# Patient Record
Sex: MALE
[De-identification: names, ages, dates, MRNs, and addresses within clinical notes are randomized per-mention and may not be internally consistent; named-entity substitution may affect disease eponyms.]

---

## 2021-12-13 PROBLEM — Z00.00 ENCOUNTER FOR PREVENTIVE HEALTH EXAMINATION: Status: ACTIVE | Noted: 2021-12-13

## 2022-02-01 DIAGNOSIS — Z01.812 ENCOUNTER FOR PREPROCEDURAL LABORATORY EXAMINATION: ICD-10-CM

## 2022-04-15 ENCOUNTER — APPOINTMENT (OUTPATIENT)
Dept: PULMONOLOGY | Facility: CLINIC | Age: 42
End: 2022-04-15
Payer: COMMERCIAL

## 2022-04-15 VITALS
TEMPERATURE: 98 F | DIASTOLIC BLOOD PRESSURE: 80 MMHG | HEIGHT: 69 IN | OXYGEN SATURATION: 98 % | WEIGHT: 192 LBS | HEART RATE: 70 BPM | RESPIRATION RATE: 16 BRPM | SYSTOLIC BLOOD PRESSURE: 120 MMHG | BODY MASS INDEX: 28.44 KG/M2

## 2022-04-15 DIAGNOSIS — U07.1 COVID-19: ICD-10-CM

## 2022-04-15 DIAGNOSIS — Z57.9 OCCUPATIONAL EXPOSURE TO UNSPECIFIED RISK FACTOR: ICD-10-CM

## 2022-04-15 DIAGNOSIS — Z86.19 PERSONAL HISTORY OF OTHER INFECTIOUS AND PARASITIC DISEASES: ICD-10-CM

## 2022-04-15 DIAGNOSIS — Z87.09 PERSONAL HISTORY OF OTHER DISEASES OF THE RESPIRATORY SYSTEM: ICD-10-CM

## 2022-04-15 DIAGNOSIS — F90.1 ATTENTION-DEFICIT HYPERACTIVITY DISORDER, PREDOMINANTLY HYPERACTIVE TYPE: ICD-10-CM

## 2022-04-15 DIAGNOSIS — Z78.9 OTHER SPECIFIED HEALTH STATUS: ICD-10-CM

## 2022-04-15 DIAGNOSIS — Z87.01 PERSONAL HISTORY OF PNEUMONIA (RECURRENT): ICD-10-CM

## 2022-04-15 DIAGNOSIS — J45.909 UNSPECIFIED ASTHMA, UNCOMPLICATED: ICD-10-CM

## 2022-04-15 DIAGNOSIS — Z82.5 FAMILY HISTORY OF ASTHMA AND OTHER CHRONIC LOWER RESPIRATORY DISEASES: ICD-10-CM

## 2022-04-15 PROCEDURE — 94729 DIFFUSING CAPACITY: CPT

## 2022-04-15 PROCEDURE — 95012 NITRIC OXIDE EXP GAS DETER: CPT

## 2022-04-15 PROCEDURE — 71046 X-RAY EXAM CHEST 2 VIEWS: CPT

## 2022-04-15 PROCEDURE — 99204 OFFICE O/P NEW MOD 45 MIN: CPT | Mod: 25

## 2022-04-15 PROCEDURE — 94010 BREATHING CAPACITY TEST: CPT

## 2022-04-15 PROCEDURE — 94727 GAS DIL/WSHOT DETER LNG VOL: CPT

## 2022-04-15 PROCEDURE — 94618 PULMONARY STRESS TESTING: CPT

## 2022-04-15 PROCEDURE — ZZZZZ: CPT

## 2022-04-15 RX ORDER — ALBUTEROL SULFATE 90 UG/1
108 (90 BASE) INHALANT RESPIRATORY (INHALATION)
Refills: 0 | Status: ACTIVE | COMMUNITY

## 2022-04-15 SDOH — HEALTH STABILITY - PHYSICAL HEALTH: OCCUPATIONAL EXPOSURE TO UNSPECIFIED RISK FACTOR: Z57.9

## 2022-04-15 NOTE — PROCEDURE
[FreeTextEntry1] : CXR revealed a normal sized heart; there was no evidence of infiltrate or effusion -- A normal appearing chest radiograph \par \par FENO was 13; a normal value being less than 25. Fractional exhaled nitric oxide (FENO) is regarded as a simple, noninvasive method for assessing eosinophilic airway inflammation. Produced by a variety of cells within the lung, nitric oxide (NO) concentrations are generally low in healthy individuals. However, high concentrations of NO appear to be involved in nonspecific host defense mechanisms and chronic inflammatory  diseases such as asthma. The American Thoracic Society (ATS) therefore recommended using FENO to aid in the diagnosis and monitoring of eosinophilic airway inflammation and asthma, and for identifying steroid responsive individuals whose chronic respiratory symptoms may be caused by airway inflammation \par \par Full PFT revealed very mild obstructive dysfunction at mid-low lung volumes, with a FEV1 of 3.79L, which is 92% of predicted, normal lung volumes, and a diffusion of 21.9, which is 101% of predicted, with a normal flow volume loop \par \par \par 6 minute walk test reveals a low saturation of 95% with very slight dyspnea or fatigue; walked 635.7 meters

## 2022-04-15 NOTE — ASSESSMENT
[FreeTextEntry1] : Mr. LI is a 41 year male with a history of chicken pox, pneumonia in college, asthma since childhood, and COVID-19 infection (11/2021), ADHD, and allergies who now comes in for an initial pulmonary evaluation for SOB, asthma, allergies, possibly NICOLA (PMR), ?LPR\par \par \par The patient's SOB is felt to be multifactorial:\par -poor mechanics of breathing\par -out of shape/overweight\par -Pulmonary\par   -mild persistent asthma\par -Cardiac\par \par Problem 1: Mild persistent asthma (allergen driven)\par -Add Trelegy 100 1 inhalation qD \par -Add Ventolin 2 puffs Q6H, pre-exercise \par -Asthma is believed to be caused by inherited (genetic) and environmental factor, but its exact cause is unknown. asthma may be triggered by allergens, lung infections, or irritants in the air. Asthma triggers are different for each person \par -Inhaler technique reviewed as well as oral hygiene technique reviewed with patient. Avoidance of cold air, extremes of temperature, rescue inhaler should be used before exercise. Order of medication reviewed with patient. Recommended use of a cool mist humidifier in the bedroom. \par \par Problem 2: Allergies/Sinus\par -Add Olopatadine 0.6% 1 sniff BID \par -Get blood work to include: asthma panel, food IgE panel, IgE level, eosinophil level, vitamin D level \par -Environmental measures for allergies were encouraged including mattress and pillow cover, air purifier, and environmental controls. \par \par \par Problem 3: ?LPR vs post nasal drip syndrome\par -recommended re-evaluating diet\par -recommended keeping a diary of events\par - Rule of 2s: avoid eating too much, eating too late, eating too spicy, eating two hours before bed.\par - Things to avoid including overeating, spicy foods, tight clothing, eating within two hours of bed, this list is not all inclusive.\par - For treatments of reflux, possible options discussed including diet control, H2 blockers, PPIs, as well as coating motility agents discussed as treatment options. Timing of meals and proximity of last meal to sleep were discussed. If symptoms persist, a formal gastrointestinal evaluation is needed. \par \par Problem 4: ?NICOLA (?PMR; risk factors: large neck size, snoring)\par -check free and total testosterone level\par -Complete home sleep study \par -Sleep apnea is associated with adverse clinical consequences which can affect most organ systems. Cardiovascular disease risk includes arrhythmias, atrial fibrillation, hypertension, coronary artery disease, and stroke. Metabolic disorders include diabetes type 2, non-alcoholic fatty liver disease. Mood disorder especially depression; and cognitive decline especially in the elderly. Associations with chronic reflux/Lance’s esophagus some but not all inclusive. \par \par Problem 5: s/p COVID-19 infection (11/2021)\par -s/p COVID vaccination x2\par -Recommended not to get another booster until they are updated against the current variant \par \par Problem 6: Cardiac\par -Recommend cardiac follow up evaluation with cardiologist if needed \par \par Problem 7: overweight/out of shape\par -Recommend "Muniq" OTC for weight loss, energy, and blood sugar\par - Weight loss, exercise and diet control were discussed and are highly encouraged. Treatment options were given such as aqua therapy, and contacting a nutritionist. Recommended to use the elliptical, stationary bike, less use of treadmill. Mindful eating was explained to the patient. Obesity is associated with worsening asthma, SOB, and potential for cardiac disease, diabetes, and other underlying medical conditions.\par \par Problem 8: Poor mechanics of breathing\par -Recommended Wim Hof and Buteyko breathing techniques\par - Proper breathing techniques were reviewed with an emphasis on exhalation. Patient instructed to breath in for 1 second and out for four seconds. Patient was encouraged not to talk while walking.\par \par Problem 9: Health Maintenance\par -s/p flu shot\par -recommended strep pneumonia vaccines: Prevnar-13 vaccine, follow by Pneumo vaccine 23 one year following\par -recommended early intervention for URIs\par -recommended regular osteoporosis evaluations\par -recommended early dermatological evaluations\par -recommended after the age of 50 to the age of 70, colonoscopy every 5 years\par \par f/u in 6-8 weeks\par pt is encouraged to call or fax the office with any questions or concerns.

## 2022-04-15 NOTE — ADDENDUM
[FreeTextEntry1] : Documented by Gaston Howell acting as a scribe for Dr. Hugh Francois on 04/15/2022\par \par All medical record entries made by the scribe were at my, Dr. Hugh Francois's, direction and personally dictated by me on 04/15/2022. I have reviewed the chart and agree that the record accurately reflects my personal performance of the history, physical exam, assessment, and plan. I have also personally directed, reviewed, and agree with the discharge instructions.

## 2022-04-15 NOTE — HISTORY OF PRESENT ILLNESS
[TextBox_4] : Mr. LI is a 41 year male with a history of chicken pox, pneumonia in college, asthma since childhood, and COVID-19 infection (11/2021), ADHD, and allergies who now comes in for an initial pulmonary evaluation. His chief complaint is\par -he notes that his asthma is provoked by allergies and worsened with exercise\par -he notes having walking pneumonia when he was in college\par -he notes his allergies are worse in the spring due to allergies and worse in the winter due to the dry air\par -he notes SOB which can progress to wheezing\par -he notes post nasal drip with allergies all year\par -he notes that he has started snoring recently\par -he notes that his weight is slightly higher than he would like\par -he notes his energy levels are 5/10\par -he notes that his neck size is 16.5\par -he reports not being able to fall asleep while watching a boring TV show \par -he notes that his breathing is short and shallow when he sits and watches TV\par -he notices his memory is good\par -he notes that his concentration is poor due to ADHD\par -he notes that exercise typically helps his concentration\par -he notes that he runs and cycles for exercise but is not doing so right now\par -s/p COVID infection November 2021\par -s/p COVID vaccination x2\par -he notes that he has aches and pains if he does not exercise (muscle pain mostly but joints as well)\par \par \par -patient denies any headaches, nausea, vomiting, fever, chills, sweats, chest pain, chest pressure, palpitations, coughing, fatigue, diarrhea, constipation, dysphagia, dizziness, leg swelling, leg pain, itchy eyes, itchy ears, heartburn, reflux or sour taste in the mouth

## 2022-06-16 ENCOUNTER — APPOINTMENT (OUTPATIENT)
Dept: PULMONOLOGY | Facility: CLINIC | Age: 42
End: 2022-06-16

## 2022-07-28 ENCOUNTER — LABORATORY RESULT (OUTPATIENT)
Age: 42
End: 2022-07-28

## 2022-07-29 LAB
24R-OH-CALCIDIOL SERPL-MCNC: 40.8 PG/ML
25(OH)D3 SERPL-MCNC: 23.6 NG/ML
BASOPHILS # BLD AUTO: 0.06 K/UL
BASOPHILS NFR BLD AUTO: 0.9 %
EOSINOPHIL # BLD AUTO: 0.44 K/UL
EOSINOPHIL NFR BLD AUTO: 6.3 %
HCT VFR BLD CALC: 41.8 %
HGB BLD-MCNC: 14.4 G/DL
IMM GRANULOCYTES NFR BLD AUTO: 0.7 %
LYMPHOCYTES # BLD AUTO: 1.69 K/UL
LYMPHOCYTES NFR BLD AUTO: 24 %
MAN DIFF?: NORMAL
MCHC RBC-ENTMCNC: 31.2 PG
MCHC RBC-ENTMCNC: 34.4 GM/DL
MCV RBC AUTO: 90.5 FL
MONOCYTES # BLD AUTO: 0.64 K/UL
MONOCYTES NFR BLD AUTO: 9.1 %
NEUTROPHILS # BLD AUTO: 4.15 K/UL
NEUTROPHILS NFR BLD AUTO: 59 %
PLATELET # BLD AUTO: 290 K/UL
RBC # BLD: 4.62 M/UL
RBC # FLD: 12.2 %
WBC # FLD AUTO: 7.03 K/UL

## 2022-07-30 LAB
DEPRECATED GOOSE FEATHER IGE RAST QL: 0
GOOSE FEATHER IGE QN: <0.1 KUA/L
TOTAL IGE SMQN RAST: 74 KU/L

## 2022-07-31 LAB
TESTOST FREE SERPL-MCNC: 9.1 PG/ML
TESTOST SERPL-MCNC: 340 NG/DL

## 2022-08-01 ENCOUNTER — APPOINTMENT (OUTPATIENT)
Dept: PULMONOLOGY | Facility: CLINIC | Age: 42
End: 2022-08-01

## 2022-08-01 ENCOUNTER — NON-APPOINTMENT (OUTPATIENT)
Age: 42
End: 2022-08-01

## 2022-08-01 VITALS
SYSTOLIC BLOOD PRESSURE: 132 MMHG | HEART RATE: 68 BPM | OXYGEN SATURATION: 98 % | BODY MASS INDEX: 27.2 KG/M2 | RESPIRATION RATE: 16 BRPM | DIASTOLIC BLOOD PRESSURE: 84 MMHG | WEIGHT: 190 LBS | HEIGHT: 70 IN | TEMPERATURE: 96.9 F

## 2022-08-01 PROCEDURE — 95012 NITRIC OXIDE EXP GAS DETER: CPT

## 2022-08-01 PROCEDURE — 94010 BREATHING CAPACITY TEST: CPT

## 2022-08-01 PROCEDURE — 99214 OFFICE O/P EST MOD 30 MIN: CPT | Mod: 25

## 2022-08-01 RX ORDER — ERGOCALCIFEROL 1.25 MG/1
1.25 MG CAPSULE, LIQUID FILLED ORAL
Qty: 4 | Refills: 3 | Status: ACTIVE | COMMUNITY
Start: 2022-08-01 | End: 1900-01-01

## 2022-08-01 RX ORDER — FAMOTIDINE 40 MG/1
40 TABLET, FILM COATED ORAL
Qty: 90 | Refills: 1 | Status: ACTIVE | COMMUNITY
Start: 2022-08-01 | End: 1900-01-01

## 2022-08-01 RX ORDER — AMOXICILLIN 875 MG/1
875 TABLET, FILM COATED ORAL
Qty: 20 | Refills: 0 | Status: DISCONTINUED | COMMUNITY
Start: 2022-04-29

## 2022-08-01 RX ORDER — BECLOMETHASONE DIPROPIONATE 80 UG/1
80 AEROSOL, METERED NASAL
Qty: 3 | Refills: 1 | Status: ACTIVE | COMMUNITY
Start: 2022-08-01 | End: 1900-01-01

## 2022-08-01 RX ORDER — PREDNISONE 20 MG/1
20 TABLET ORAL
Qty: 5 | Refills: 0 | Status: DISCONTINUED | COMMUNITY
Start: 2022-04-29

## 2022-08-01 NOTE — PROCEDURE
[FreeTextEntry1] : Feno was 15; a normal value being less than 25. Fractional exhaled nitric oxide (FENO) is regarded as a simple, noninvasive method for assessing eosinophilic airway inflammation. Produced by a variety of cells within the lung, nitric oxide (NO) concentrations are generally low in healthy individuals. However, high concentrations of NO appear to be involved in nonspecific host defense mechanisms and chronic inflammatory  diseases such as asthma. The American Thoracic Society (ATS) therefore recommended using FENO to aid in the diagnosis and monitoring of eosinophilic airway inflammation and asthma, and for identifying steroid responsive individuals whose chronic respiratory symptoms may be caused by airway inflammation \par \par PFT revealed normal flows, with a FEV1 of 4.07L, which is 98% of predicted, with a normal flow volume loop

## 2022-08-01 NOTE — ASSESSMENT
[FreeTextEntry1] : Mr. LI is a 42 year male with a history of chicken pox, pneumonia in college, asthma since childhood, and COVID-19 infection (11/2021), ADHD, and allergies who now comes in for a f/u pulmonary evaluation for SOB, asthma, allergies, possibly NICOLA (PMR), ?LPR - improved , eosinophilic asthma\par \par \par The patient's SOB is felt to be multifactorial:\par -poor mechanics of breathing\par -out of shape/overweight\par -Pulmonary\par   -mild persistent asthma\par -Cardiac\par \par Problem 1: Mild persistent asthma (allergen driven)\par -Add Trelegy 100 1 inhalation qD \par -Add Ventolin 2 puffs Q6H, pre-exercise \par -Asthma is believed to be caused by inherited (genetic) and environmental factor, but its exact cause is unknown. asthma may be triggered by allergens, lung infections, or irritants in the air. Asthma triggers are different for each person \par -Inhaler technique reviewed as well as oral hygiene technique reviewed with patient. Avoidance of cold air, extremes of temperature, rescue inhaler should be used before exercise. Order of medication reviewed with patient. Recommended use of a cool mist humidifier in the bedroom. \par \par Problem 1A: Eosinophilic Asthma\par -Asthma is believed to be caused by inherited (genetic) and environmental factor, but its exact cause is unknown. asthma may be triggered by allergens, lung infections, or irritants in the air. Asthma triggers are different for each person \par -The safety and efficacy of Nucala was established in three double-blind , randomized, placebo controlled trials in patients with severe asthma. Compared to a placebo, patients with severe asthma receiving Nucala hada fever exacerbation requiring hospitalization and.or emergency department visits, and a longer time to first exacerbation. In addition, patients with severe asthma receiving Nucala or Fasenra experienced greater reductions in their daily maintenance oral corticosteroid dose,m while maintaining asthma control compared with patients receiving placebo. Treatment with Nucala did not result in a significant improvement in lung function as measured by the volume of air exhaled by patients in one second. The most common side effects include: headache, injection site reactions back pain, weakness and fatigue; hypersensitivity reactions can occur within hours or days including swelling of the face, mouth and tongue, fainting, dizziness, hives, breathing problems and rash; herpes zoster infections have occurred. The drug is a monoclonal antibody that inhibits interleukin-5 which helps regular eosinophils, a type of white blood cell that contributes to asthma. The over-prodcution of eosinophils can cause inflammation in the lungs, increasing the frequency of asthma attacks. Patients must also take other medications, including high dose inhaled corticosteroids and at least one additional asthma drug. \par \par Problem 2: Allergies/Sinus\par -Add Qnasl 1 sniff BID \par -Add Olopatadine 0.6% 1 sniff BID \par -Get blood work to include: asthma panel + , food IgE panel + , IgE level - , eosinophil level 400 +, low vitamin D level \par -Environmental measures for allergies were encouraged including mattress and pillow cover, air purifier, and environmental controls. \par \par Problem 2A: Low Vitamin D\par -add Vitamin D supplements\par -Has been associated with asthma exacerbations and increased allergic symptoms. The goal based on recent information is maintaining levels between 50-70 and low normal is 30. Recommended 50,000 unites every two weeks to once a month depending on the level. \par \par \par Problem 3: ?LPR vs post nasal drip syndrome\par -add Pepcid 40 mg QHS \par -recommended re-evaluating diet\par -recommended keeping a diary of events\par - Rule of 2s: avoid eating too much, eating too late, eating too spicy, eating two hours before bed.\par - Things to avoid including overeating, spicy foods, tight clothing, eating within two hours of bed, this list is not all inclusive.\par - For treatments of reflux, possible options discussed including diet control, H2 blockers, PPIs, as well as coating motility agents discussed as treatment options. Timing of meals and proximity of last meal to sleep were discussed. If symptoms persist, a formal gastrointestinal evaluation is needed. \par \par Problem 4: ?NICOLA (?PMR; risk factors: large neck size, snoring)\par -s/p free and total testosterone level - WNL\par -Complete home sleep study - pending\par -Sleep apnea is associated with adverse clinical consequences which can affect most organ systems. Cardiovascular disease risk includes arrhythmias, atrial fibrillation, hypertension, coronary artery disease, and stroke. Metabolic disorders include diabetes type 2, non-alcoholic fatty liver disease. Mood disorder especially depression; and cognitive decline especially in the elderly. Associations with chronic reflux/Lance’s esophagus some but not all inclusive. \par \par Problem 5: s/p COVID-19 infection (11/2021)\par -s/p COVID vaccination x2\par -Recommended not to get another booster until they are updated against the current variant \par \par Problem 6: Cardiac\par -Recommend cardiac follow up evaluation with cardiologist if needed \par \par Problem 7: overweight/out of shape\par -Recommend "Muniq" OTC for weight loss, energy, and blood sugar\par - Weight loss, exercise and diet control were discussed and are highly encouraged. Treatment options were given such as aqua therapy, and contacting a nutritionist. Recommended to use the elliptical, stationary bike, less use of treadmill. Mindful eating was explained to the patient. Obesity is associated with worsening asthma, SOB, and potential for cardiac disease, diabetes, and other underlying medical conditions.\par \par Problem 8: Poor mechanics of breathing\par -Recommended Wim Hof and Buteyko breathing techniques\par - Proper breathing techniques were reviewed with an emphasis on exhalation. Patient instructed to breath in for 1 second and out for four seconds. Patient was encouraged not to talk while walking.\par \par Problem 9: Health Maintenance\par -s/p flu shot\par -recommended strep pneumonia vaccines: Prevnar-13 vaccine, follow by Pneumo vaccine 23 one year following\par -recommended early intervention for URIs\par -recommended regular osteoporosis evaluations\par -recommended early dermatological evaluations\par -recommended after the age of 50 to the age of 70, colonoscopy every 5 years\par \par f/u in 6-8 weeks\par pt is encouraged to call or fax the office with any questions or concerns.

## 2022-08-01 NOTE — ADDENDUM
[FreeTextEntry1] : Documented by Mathieu Howell acting as a scribe for Dr. Hugh Francois on 08/01/2022.\par \par All medical record entries made by the Scribe were at my, Dr. Hugh Francois's, direction and personally dictated by me on 08/01/2022. I have reviewed the chart and agree that the record accurately reflects my personal performance of the history, physical exam, assessment and plan. I have also personally directed, reviewed, and agree with the discharge instructions.

## 2022-08-01 NOTE — HISTORY OF PRESENT ILLNESS
[TextBox_4] : Mr. LI is a 42 year male with a history of chicken pox, pneumonia in college, asthma since childhood, and COVID-19 infection (11/2021), ADHD, and allergies who now comes in for a f/u pulmonary evaluation. His chief complaint is\par -he notes feeling generally well \par -he notes that Trelegy opens his lungs up\par -he notes PND has gotten better with a nasal spray\par -he notes that he is almost sure he has LPR\par -he notes snoring since COVID-19\par -he notes his bowels are regular \par -he notes his sense of smell and taste are normal \par -he denies any visual issues \par -he denies hoarseness \par -he notes that his energy levels are 5-6/10\par -he notes that he is not sleeping well \par -he notes exercising (running once or twice a week)\par -he notes that his sinuses felt dry during COVID\par -\par \par -patient denies any headaches, nausea, vomiting, fever, chills, sweats, chest pain, chest pressure, palpitations, coughing, wheezing, fatigue, diarrhea, constipation, dysphagia, myalgias, dizziness, leg swelling, leg pain, itchy eyes, itchy ears, heartburn, reflux or sour taste in the mouth

## 2022-08-04 LAB
A ALTERNATA IGE QN: 1.73 KUA/L
A ALTERNATA IGE QN: 1.73 KUA/L
A FUMIGATUS IGE QN: 0.1 KUA/L
A FUMIGATUS IGE QN: 0.1 KUA/L
BERMUDA GRASS IGE QN: 0.11 KUA/L
BOXELDER IGE QN: 0.24 KUA/L
C ALBICANS IGE QN: 0.32 KUA/L
C HERBARUM IGE QN: <0.1 KUA/L
C HERBARUM IGE QN: <0.1 KUA/L
CALIF WALNUT IGE QN: 0.84 KUA/L
CAT DANDER IGE QN: 1.11 KUA/L
CAT DANDER IGE QN: 1.11 KUA/L
CLAM IGE QN: <0.1 KUA/L
CMN PIGWEED IGE QN: 0.15 KUA/L
CODFISH IGE QN: <0.1 KUA/L
COMMON RAGWEED IGE QN: 0.31 KUA/L
COMMON RAGWEED IGE QN: 0.31 KUA/L
CORN IGE QN: <0.1 KUA/L
COTTONWOOD IGE QN: 0.2 KUA/L
COW MILK IGE QN: <0.1 KUA/L
D FARINAE IGE QN: 0.18 KUA/L
D FARINAE IGE QN: 0.18 KUA/L
D PTERONYSS IGE QN: <0.1 KUA/L
D PTERONYSS IGE QN: <0.1 KUA/L
DEPRECATED A ALTERNATA IGE RAST QL: 2
DEPRECATED A ALTERNATA IGE RAST QL: 2
DEPRECATED A FUMIGATUS IGE RAST QL: NORMAL
DEPRECATED A FUMIGATUS IGE RAST QL: NORMAL
DEPRECATED BERMUDA GRASS IGE RAST QL: NORMAL
DEPRECATED BOXELDER IGE RAST QL: NORMAL
DEPRECATED C ALBICANS IGE RAST QL: NORMAL
DEPRECATED C HERBARUM IGE RAST QL: 0
DEPRECATED C HERBARUM IGE RAST QL: 0
DEPRECATED CAT DANDER IGE RAST QL: 2
DEPRECATED CAT DANDER IGE RAST QL: 2
DEPRECATED CLAM IGE RAST QL: 0
DEPRECATED CODFISH IGE RAST QL: 0
DEPRECATED COMMON PIGWEED IGE RAST QL: NORMAL
DEPRECATED COMMON RAGWEED IGE RAST QL: NORMAL
DEPRECATED COMMON RAGWEED IGE RAST QL: NORMAL
DEPRECATED CORN IGE RAST QL: 0
DEPRECATED COTTONWOOD IGE RAST QL: NORMAL
DEPRECATED COW MILK IGE RAST QL: 0
DEPRECATED D FARINAE IGE RAST QL: NORMAL
DEPRECATED D FARINAE IGE RAST QL: NORMAL
DEPRECATED D PTERONYSS IGE RAST QL: 0
DEPRECATED D PTERONYSS IGE RAST QL: 0
DEPRECATED DOG DANDER IGE RAST QL: 1
DEPRECATED DOG DANDER IGE RAST QL: 1
DEPRECATED DUCK FEATHER IGE RAST QL: 0
DEPRECATED EGG WHITE IGE RAST QL: 0
DEPRECATED GOOSEFOOT IGE RAST QL: 0
DEPRECATED LONDON PLANE IGE RAST QL: NORMAL
DEPRECATED M RACEMOSUS IGE RAST QL: 0
DEPRECATED MOUSE URINE PROT IGE RAST QL: 0
DEPRECATED MUGWORT IGE RAST QL: 0
DEPRECATED P NOTATUM IGE RAST QL: 0
DEPRECATED PEANUT IGE RAST QL: 0
DEPRECATED RED CEDAR IGE RAST QL: 2
DEPRECATED ROACH IGE RAST QL: 0
DEPRECATED ROACH IGE RAST QL: 0
DEPRECATED SCALLOP IGE RAST QL: <0.1 KUA/L
DEPRECATED SESAME SEED IGE RAST QL: NORMAL
DEPRECATED SHEEP SORREL IGE RAST QL: NORMAL
DEPRECATED SHRIMP IGE RAST QL: 0
DEPRECATED SILVER BIRCH IGE RAST QL: NORMAL
DEPRECATED SOYBEAN IGE RAST QL: 0
DEPRECATED TIMOTHY IGE RAST QL: 2
DEPRECATED TIMOTHY IGE RAST QL: 2
DEPRECATED WALNUT IGE RAST QL: 0
DEPRECATED WHEAT IGE RAST QL: 0
DEPRECATED WHITE ASH IGE RAST QL: 2
DEPRECATED WHITE OAK IGE RAST QL: 0
DEPRECATED WHITE OAK IGE RAST QL: 0
DOG DANDER IGE QN: 0.37 KUA/L
DOG DANDER IGE QN: 0.37 KUA/L
DUCK FEATHER IGE QN: <0.1 KUA/L
EGG WHITE IGE QN: <0.1 KUA/L
GOOSEFOOT IGE QN: <0.1 KUA/L
LONDON PLANE IGE QN: 0.13 KUA/L
M RACEMOSUS IGE QN: <0.1 KUA/L
MOUSE URINE PROT IGE QN: <0.1 KUA/L
MUGWORT IGE QN: <0.1 KUA/L
MULBERRY (T70) CLASS: 0
MULBERRY (T70) CONC: <0.1 KUA/L
P NOTATUM IGE QN: <0.1 KUA/L
PEANUT IGE QN: <0.1 KUA/L
RED CEDAR IGE QN: 0.83 KUA/L
ROACH IGE QN: <0.1 KUA/L
ROACH IGE QN: <0.1 KUA/L
SCALLOP IGE QN: 0
SCALLOP IGE QN: <0.1 KUA/L
SESAME SEED IGE QN: 0.29 KUA/L
SHEEP SORREL IGE QN: 0.13 KUA/L
SILVER BIRCH IGE QN: 0.15 KUA/L
SOYBEAN IGE QN: <0.1 KUA/L
TIMOTHY IGE QN: 0.85 KUA/L
TIMOTHY IGE QN: 0.85 KUA/L
TREE ALLERG MIX1 IGE QL: 2
WALNUT IGE QN: <0.1 KUA/L
WHEAT IGE QN: <0.1 KUA/L
WHITE ASH IGE QN: 1.32 KUA/L
WHITE ELM IGE QN: 0.53 KUA/L
WHITE ELM IGE QN: 1
WHITE OAK IGE QN: <0.1 KUA/L
WHITE OAK IGE QN: <0.1 KUA/L

## 2022-08-09 ENCOUNTER — NON-APPOINTMENT (OUTPATIENT)
Age: 42
End: 2022-08-09

## 2022-12-05 RX ORDER — OLOPATADINE HYDROCHLORIDE 665 UG/1
0.6 SPRAY, METERED NASAL
Qty: 3 | Refills: 1 | Status: ACTIVE | COMMUNITY
Start: 2022-04-15 | End: 1900-01-01

## 2023-02-27 ENCOUNTER — NON-APPOINTMENT (OUTPATIENT)
Age: 43
End: 2023-02-27

## 2023-02-27 ENCOUNTER — APPOINTMENT (OUTPATIENT)
Dept: PULMONOLOGY | Facility: CLINIC | Age: 43
End: 2023-02-27
Payer: COMMERCIAL

## 2023-02-27 VITALS
HEART RATE: 66 BPM | SYSTOLIC BLOOD PRESSURE: 120 MMHG | DIASTOLIC BLOOD PRESSURE: 82 MMHG | TEMPERATURE: 97.1 F | WEIGHT: 190 LBS | BODY MASS INDEX: 27.2 KG/M2 | HEIGHT: 70 IN | RESPIRATION RATE: 16 BRPM | OXYGEN SATURATION: 98 %

## 2023-02-27 DIAGNOSIS — K21.9 GASTRO-ESOPHAGEAL REFLUX DISEASE W/OUT ESOPHAGITIS: ICD-10-CM

## 2023-02-27 DIAGNOSIS — R06.83 SNORING: ICD-10-CM

## 2023-02-27 PROCEDURE — 99214 OFFICE O/P EST MOD 30 MIN: CPT | Mod: 25

## 2023-02-27 PROCEDURE — 94010 BREATHING CAPACITY TEST: CPT

## 2023-02-27 PROCEDURE — 95012 NITRIC OXIDE EXP GAS DETER: CPT

## 2023-02-27 RX ORDER — PANTOPRAZOLE 40 MG/1
40 TABLET, DELAYED RELEASE ORAL
Qty: 1 | Refills: 1 | Status: ACTIVE | COMMUNITY
Start: 2023-02-27 | End: 1900-01-01

## 2023-02-27 RX ORDER — FAMOTIDINE 40 MG/1
40 TABLET, FILM COATED ORAL
Qty: 90 | Refills: 1 | Status: ACTIVE | COMMUNITY
Start: 2023-02-27 | End: 1900-01-01

## 2023-02-27 NOTE — ADDENDUM
[FreeTextEntry1] : Documented by Janel Lofton acting as a scribe for Dr. Hugh Francois on 02/27/2023 \par \par All medical record entries made by the Scribe were at my, Dr. Hugh Francois's, direction and personally dictated by me on 02/27/2023 . I have reviewed the chart and agree that the record accurately reflects my personal performance of the history, physical exam, assessment and plan. I have also personally directed, reviewed, and agree with the discharge instructions.

## 2023-02-27 NOTE — ASSESSMENT
[FreeTextEntry1] : Mr. LI is a 42 year male with a history of chicken pox, pneumonia in college, asthma since childhood, and COVID-19 infection (11/2021), ADHD, and allergies who now comes in for a f/u pulmonary evaluation for SOB, asthma, allergies, possibly NICOLA (PMR), ?LPR - improved , eosinophilic asthma- post nasal drip / LPR symptoms #1 issue \par \par \par The patient's SOB is felt to be multifactorial:\par -poor mechanics of breathing\par -out of shape/overweight\par -Pulmonary\par   -mild persistent asthma\par -Cardiac\par \par Problem 1: Mild persistent asthma (allergen driven)\par -continue Trelegy 100 1 inhalation qD \par -continue Ventolin 2 puffs Q6H, pre-exercise \par -Asthma is believed to be caused by inherited (genetic) and environmental factor, but its exact cause is unknown. asthma may be triggered by allergens, lung infections, or irritants in the air. Asthma triggers are different for each person \par -Inhaler technique reviewed as well as oral hygiene technique reviewed with patient. Avoidance of cold air, extremes of temperature, rescue inhaler should be used before exercise. Order of medication reviewed with patient. Recommended use of a cool mist humidifier in the bedroom. \par \par Problem 1A: Eosinophilic Asthma\par -Asthma is believed to be caused by inherited (genetic) and environmental factor, but its exact cause is unknown. asthma may be triggered by allergens, lung infections, or irritants in the air. Asthma triggers are different for each person \par -The safety and efficacy of Nucala was established in three double-blind , randomized, placebo controlled trials in patients with severe asthma. Compared to a placebo, patients with severe asthma receiving Nucala hada fever exacerbation requiring hospitalization and.or emergency department visits, and a longer time to first exacerbation. In addition, patients with severe asthma receiving Nucala or Fasenra experienced greater reductions in their daily maintenance oral corticosteroid dose,m while maintaining asthma control compared with patients receiving placebo. Treatment with Nucala did not result in a significant improvement in lung function as measured by the volume of air exhaled by patients in one second. The most common side effects include: headache, injection site reactions back pain, weakness and fatigue; hypersensitivity reactions can occur within hours or days including swelling of the face, mouth and tongue, fainting, dizziness, hives, breathing problems and rash; herpes zoster infections have occurred. The drug is a monoclonal antibody that inhibits interleukin-5 which helps regular eosinophils, a type of white blood cell that contributes to asthma. The over-prodcution of eosinophils can cause inflammation in the lungs, increasing the frequency of asthma attacks. Patients must also take other medications, including high dose inhaled corticosteroids and at least one additional asthma drug. \par \par Problem 2: Allergies/Sinus\par -Add Qnasl 1 sniff BID \par -Add Olopatadine 0.6% 1 sniff BID \par -Get blood work to include: asthma panel + , food IgE panel + , IgE level - , eosinophil level 400 +, low vitamin D level \par -Environmental measures for allergies were encouraged including mattress and pillow cover, air purifier, and environmental controls. \par \par Problem 2A: Low Vitamin D\par -add Vitamin D supplements\par -Has been associated with asthma exacerbations and increased allergic symptoms. The goal based on recent information is maintaining levels between 50-70 and low normal is 30. Recommended 50,000 unites every two weeks to once a month depending on the level. \par \par \par Problem 3: ?LPR vs post nasal drip syndrome (#1 issue)\par -add Protonix 40 mg QAM, pre-breakfast \par -add Pepcid 40 mg QHS \par -recommended re-evaluating diet\par -recommended keeping a diary of events\par - Rule of 2s: avoid eating too much, eating too late, eating too spicy, eating two hours before bed.\par - Things to avoid including overeating, spicy foods, tight clothing, eating within two hours of bed, this list is not all inclusive.\par - For treatments of reflux, possible options discussed including diet control, H2 blockers, PPIs, as well as coating motility agents discussed as treatment options. Timing of meals and proximity of last meal to sleep were discussed. If symptoms persist, a formal gastrointestinal evaluation is needed. \par \par Problem 4: ?NICOLA (?PMR; risk factors: large neck size, snoring)\par -s/p free and total testosterone level - WNL\par -Complete home sleep study - pending\par -Sleep apnea is associated with adverse clinical consequences which can affect most organ systems. Cardiovascular disease risk includes arrhythmias, atrial fibrillation, hypertension, coronary artery disease, and stroke. Metabolic disorders include diabetes type 2, non-alcoholic fatty liver disease. Mood disorder especially depression; and cognitive decline especially in the elderly. Associations with chronic reflux/Lance’s esophagus some but not all inclusive. \par \par Problem 5: s/p COVID-19 infection (11/2021)\par -s/p COVID vaccination x2\par -Recommended not to get another booster until they are updated against the current variant \par \par Problem 6: Cardiac\par -Recommend cardiac follow up evaluation with cardiologist if needed \par \par Problem 7: overweight/out of shape\par -Recommend "Muniq" OTC for weight loss, energy, and blood sugar\par - Weight loss, exercise and diet control were discussed and are highly encouraged. Treatment options were given such as aqua therapy, and contacting a nutritionist. Recommended to use the elliptical, stationary bike, less use of treadmill. Mindful eating was explained to the patient. Obesity is associated with worsening asthma, SOB, and potential for cardiac disease, diabetes, and other underlying medical conditions.\par \par Problem 8: Poor mechanics of breathing\par -Recommended Wim Hof and Buteyko breathing techniques\par - Proper breathing techniques were reviewed with an emphasis on exhalation. Patient instructed to breath in for 1 second and out for four seconds. Patient was encouraged not to talk while walking.\par \par Problem 9: Health Maintenance\par -s/p flu shot\par -recommended strep pneumonia vaccines: Prevnar-13 vaccine, follow by Pneumo vaccine 23 one year following\par -recommended early intervention for URIs\par -recommended regular osteoporosis evaluations\par -recommended early dermatological evaluations\par -recommended after the age of 50 to the age of 70, colonoscopy every 5 years\par \par f/u in 6-8 weeks\par pt is encouraged to call or fax the office with any questions or concerns.

## 2023-02-27 NOTE — HISTORY OF PRESENT ILLNESS
[TextBox_4] : Mr. LI is a 42 year male with a history of chicken pox, pneumonia in college, asthma since childhood, and COVID-19 infection (11/2021), ADHD, and allergies who now comes in for a f/u pulmonary evaluation. His chief complaint is\par - he notes he has been feeling okay \par - he has been exercising about 3 days a week now, 45 minutes each day ([employer] / Resistance running)\par - no chest pain / pressure\par - he notes sometimes he feels winded but he feels it may be due to him feeling out of shape \par - he notes he fees like the soft part of is palate has gotten swollen and never went down \par - he notes occasionally he feels like he has to cough and it feels like he's going to pule. It starts with a tickle and becomes a spasm. This has been happening more frequently. \par - his weight is stable \par - bowels are regular\par - energy levels are 8/10 \par - he notes his vision is okay \par - denies in voice hoarseness\par - he notes having a PND \par - no wheezing \par - he notes he has been taking Trelegy \par - he notes since his palette has been swollen he snores ; he never used to snore before that \par -He denies any visual issues, headaches, nausea, vomiting, fever, chills, sweats, chest pains, chest pressure, diarrhea, constipation, dysphagia, myalgia, dizziness, leg swelling, leg pain, itchy eyes, itchy ears, heartburn, reflux, or sour taste in the mouth.

## 2023-02-27 NOTE — PROCEDURE
[FreeTextEntry1] : PFT reveals normal flows, with an FEV1 of   4.10L, which is  99% of predicted, with normal flow volume loop \par \par FENO was 14; normal value being less than 25\par Fractional exhaled nitric oxide (FENO) is regarded as a simple, noninvasive method for assessing eosinophilic airway inflammation. Produced by a variety of cells within the lung, nitric oxide (NO) concentrations are generally low in healthy individuals. However, high concentrations of NO appear to be involved in nonspecific host defense mechanisms and chronic inflammatory diseases such as asthma. The American Thoracic Society (ATS) therefore has recommended using FENO to aid in the diagnosis and monitoring of eosinophilic airway inflammation and asthma, and for identifying steroid responsive individuals whose chronic respiratory symptoms may be airway inflammation.

## 2023-06-26 ENCOUNTER — APPOINTMENT (OUTPATIENT)
Dept: PULMONOLOGY | Facility: CLINIC | Age: 43
End: 2023-06-26
Payer: COMMERCIAL

## 2023-06-26 PROCEDURE — 99442: CPT

## 2023-07-10 ENCOUNTER — APPOINTMENT (OUTPATIENT)
Dept: PULMONOLOGY | Facility: CLINIC | Age: 43
End: 2023-07-10
Payer: COMMERCIAL

## 2023-07-10 ENCOUNTER — NON-APPOINTMENT (OUTPATIENT)
Age: 43
End: 2023-07-10

## 2023-07-10 VITALS
SYSTOLIC BLOOD PRESSURE: 110 MMHG | BODY MASS INDEX: 26.48 KG/M2 | OXYGEN SATURATION: 98 % | DIASTOLIC BLOOD PRESSURE: 70 MMHG | RESPIRATION RATE: 16 BRPM | HEART RATE: 77 BPM | WEIGHT: 185 LBS | HEIGHT: 70 IN

## 2023-07-10 DIAGNOSIS — J30.9 ALLERGIC RHINITIS, UNSPECIFIED: ICD-10-CM

## 2023-07-10 DIAGNOSIS — J82.83 EOSINOPHILIC ASTHMA: ICD-10-CM

## 2023-07-10 DIAGNOSIS — G47.33 OBSTRUCTIVE SLEEP APNEA (ADULT) (PEDIATRIC): ICD-10-CM

## 2023-07-10 DIAGNOSIS — U07.1 COVID-19: ICD-10-CM

## 2023-07-10 DIAGNOSIS — R79.89 OTHER SPECIFIED ABNORMAL FINDINGS OF BLOOD CHEMISTRY: ICD-10-CM

## 2023-07-10 DIAGNOSIS — R06.02 SHORTNESS OF BREATH: ICD-10-CM

## 2023-07-10 DIAGNOSIS — J45.909 UNSPECIFIED ASTHMA, UNCOMPLICATED: ICD-10-CM

## 2023-07-10 PROCEDURE — 99214 OFFICE O/P EST MOD 30 MIN: CPT | Mod: 25

## 2023-07-10 PROCEDURE — 94010 BREATHING CAPACITY TEST: CPT

## 2023-07-10 PROCEDURE — 95012 NITRIC OXIDE EXP GAS DETER: CPT

## 2023-07-10 RX ORDER — FLUTICASONE FUROATE, UMECLIDINIUM BROMIDE AND VILANTEROL TRIFENATATE 100; 62.5; 25 UG/1; UG/1; UG/1
100-62.5-25 POWDER RESPIRATORY (INHALATION)
Qty: 3 | Refills: 1 | Status: ACTIVE | COMMUNITY
Start: 2022-04-15 | End: 1900-01-01

## 2023-07-10 RX ORDER — ALBUTEROL SULFATE 90 UG/1
108 (90 BASE) AEROSOL, METERED RESPIRATORY (INHALATION) EVERY 6 HOURS
Qty: 1 | Refills: 2 | Status: ACTIVE | COMMUNITY
Start: 2023-07-10 | End: 1900-01-01

## 2023-07-10 NOTE — ADDENDUM
[FreeTextEntry1] : Documented by Mathieu Howell acting as a scribe for Dr. Hugh Francois on 07/10/2023.\par \par All medical record entries made by the Scribe were at my, Dr. Hugh Francois's, direction and personally dictated by me on 07/10/2023. I have reviewed the chart and agree that the record accurately reflects my personal performance of the history, physical exam, assessment and plan. I have also personally directed, reviewed, and agree with the discharge instructions.

## 2023-07-10 NOTE — PROCEDURE
[FreeTextEntry1] : PFT revealed normal flows, with a FEV1 of 4.05L, which is 98% of predicted, with a normal flow volume loop. -PFTs performed today for evaluation of asthma and SOB (07/10/2023) \par \par Feno was 30; a normal value being less than 25. Fractional exhaled nitric oxide (FENO) is regarded as a simple, noninvasive method for assessing eosinophilic airway inflammation. Produced by a variety of cells within the lung, nitric oxide (NO) concentrations are generally low in healthy individuals. However, high concentrations of NO appear to be involved in nonspecific host defense mechanisms and chronic inflammatory  diseases such as asthma. The American Thoracic Society (ATS) therefore recommended using FENO to aid in the diagnosis and monitoring of eosinophilic airway inflammation and asthma, and for identifying steroid responsive individuals whose chronic respiratory symptoms may be caused by airway inflammation

## 2023-07-10 NOTE — HISTORY OF PRESENT ILLNESS
[TextBox_4] : Mr. LI is a 43 year male with a history of chicken pox, pneumonia in college, asthma since childhood, and COVID-19 infection (11/2021), ADHD, and allergies who now comes in for a f/u pulmonary evaluation. His chief complaint is\par \par -he notes feeling generally well \par -he notes heartburn and reflux have improved after giving up eating late and too much\par -he notes his weight is stable \par -he notes that his energy levels are  8/10 (depending on sleep)\par -he notes his sleep is better when he exercises regularly\par -he denies any visual changes \par -he denies hoarseness \par -he notes exercising  (running, rowing, cycling)\par \par -patient denies any headaches, nausea, vomiting, fever, chills, sweats, chest pain, chest pressure, palpitations, coughing, diarrhea, constipation, dysphagia, dizziness, leg swelling, ichy eyes, itchy ears

## 2023-07-10 NOTE — PHYSICAL EXAM
[No Acute Distress] : no acute distress [Normal Oropharynx] : normal oropharynx [II] : Mallampati Class: II [Normal Appearance] : normal appearance [No Neck Mass] : no neck mass [Normal S1, S2] : normal s1, s2 [Normal Rate/Rhythm] : normal rate/rhythm [No Murmurs] : no murmurs [No Resp Distress] : no resp distress [Clear to Auscultation Bilaterally] : clear to auscultation bilaterally [No Abnormalities] : no abnormalities [Benign] : benign [Normal Gait] : normal gait [No Clubbing] : no clubbing [No Cyanosis] : no cyanosis [No Edema] : no edema [FROM] : FROM [Normal Color/ Pigmentation] : normal color/ pigmentation [No Focal Deficits] : no focal deficits [Oriented x3] : oriented x3 [Normal Affect] : normal affect [TextBox_68] : I:E 1:3, clear

## 2023-07-10 NOTE — ASSESSMENT
[FreeTextEntry1] : Mr. LI is a 43 year male with a history of chicken pox, pneumonia in college, asthma since childhood, and COVID-19 infection (11/2021), ADHD, and allergies who now comes in for a f/u pulmonary evaluation for SOB, asthma, allergies, +NICOLA (PMR), ?LPR - improved , eosinophilic asthma- post nasal drip / LPR symptoms #1 issue, untreated moderate NICOLA\par \par \par The patient's SOB is felt to be multifactorial:\par -poor mechanics of breathing\par -out of shape/overweight\par -Pulmonary\par   -mild persistent asthma\par -Cardiac\par \par Problem 1: Mild persistent asthma (allergen driven)\par -continue Trelegy 100 1 inhalation qD \par -continue Ventolin 2 puffs Q6H, pre-exercise \par -Asthma is believed to be caused by inherited (genetic) and environmental factor, but its exact cause is unknown. asthma may be triggered by allergens, lung infections, or irritants in the air. Asthma triggers are different for each person \par -Inhaler technique reviewed as well as oral hygiene technique reviewed with patient. Avoidance of cold air, extremes of temperature, rescue inhaler should be used before exercise. Order of medication reviewed with patient. Recommended use of a cool mist humidifier in the bedroom. \par \par Problem 1A: Eosinophilic Asthma\par -Asthma is believed to be caused by inherited (genetic) and environmental factor, but its exact cause is unknown. asthma may be triggered by allergens, lung infections, or irritants in the air. Asthma triggers are different for each person \par -The safety and efficacy of Nucala was established in three double-blind , randomized, placebo controlled trials in patients with severe asthma. Compared to a placebo, patients with severe asthma receiving Nucala hada fever exacerbation requiring hospitalization and.or emergency department visits, and a longer time to first exacerbation. In addition, patients with severe asthma receiving Nucala or Fasenra experienced greater reductions in their daily maintenance oral corticosteroid dose,m while maintaining asthma control compared with patients receiving placebo. Treatment with Nucala did not result in a significant improvement in lung function as measured by the volume of air exhaled by patients in one second. The most common side effects include: headache, injection site reactions back pain, weakness and fatigue; hypersensitivity reactions can occur within hours or days including swelling of the face, mouth and tongue, fainting, dizziness, hives, breathing problems and rash; herpes zoster infections have occurred. The drug is a monoclonal antibody that inhibits interleukin-5 which helps regular eosinophils, a type of white blood cell that contributes to asthma. The over-prodcution of eosinophils can cause inflammation in the lungs, increasing the frequency of asthma attacks. Patients must also take other medications, including high dose inhaled corticosteroids and at least one additional asthma drug. \par \par Problem 2: Allergies/Sinus\par -Add Qnasl 1 sniff BID \par -Add Olopatadine 0.6% 1 sniff BID \par -Get blood work to include: asthma panel + , food IgE panel + , IgE level - , eosinophil level 400 +, low vitamin D level \par -Environmental measures for allergies were encouraged including mattress and pillow cover, air purifier, and environmental controls. \par \par Problem 2A: Low Vitamin D\par -add Vitamin D supplements\par -Has been associated with asthma exacerbations and increased allergic symptoms. The goal based on recent information is maintaining levels between 50-70 and low normal is 30. Recommended 50,000 unites every two weeks to once a month depending on the level. \par \par \par Problem 3: ?LPR vs post nasal drip syndrome (#1 issue)\par -complete ENT f/u\par -continue Protonix 40 mg QAM, pre-breakfast \par -continue Pepcid 40 mg QHS \par -recommended re-evaluating diet\par -recommended keeping a diary of events\par - Rule of 2s: avoid eating too much, eating too late, eating too spicy, eating two hours before bed.\par - Things to avoid including overeating, spicy foods, tight clothing, eating within two hours of bed, this list is not all inclusive.\par - For treatments of reflux, possible options discussed including diet control, H2 blockers, PPIs, as well as coating motility agents discussed as treatment options. Timing of meals and proximity of last meal to sleep were discussed. If symptoms persist, a formal gastrointestinal evaluation is needed. \par \par Problem 4: +NICOLA (+PMR; risk factors: large neck size, snoring) - moderate\par -s/p free and total testosterone level - WNL\par -s/p home sleep study - [facility] (DD)\par -Sleep apnea is associated with adverse clinical consequences which can affect most organ systems. Cardiovascular disease risk includes arrhythmias, atrial fibrillation, hypertension, coronary artery disease, and stroke. Metabolic disorders include diabetes type 2, non-alcoholic fatty liver disease. Mood disorder especially depression; and cognitive decline especially in the elderly. Associations with chronic reflux/Lance’s esophagus some but not all inclusive. \par \par Problem 5: s/p COVID-19 infection (11/2021)\par -s/p COVID vaccination x2\par -Recommended not to get another booster until they are updated against the current variant \par \par Problem 6: Cardiac\par -Recommend cardiac follow up evaluation with cardiologist if needed \par \par Problem 7: overweight/out of shape\par -Recommend "Muniq" OTC for weight loss, energy, and blood sugar\par - Weight loss, exercise and diet control were discussed and are highly encouraged. Treatment options were given such as aqua therapy, and contacting a nutritionist. Recommended to use the elliptical, stationary bike, less use of treadmill. Mindful eating was explained to the patient. Obesity is associated with worsening asthma, SOB, and potential for cardiac disease, diabetes, and other underlying medical conditions.\par \par Problem 8: Poor mechanics of breathing\par -Recommended Wim Hof and Buteyko breathing techniques\par - Proper breathing techniques were reviewed with an emphasis on exhalation. Patient instructed to breath in for 1 second and out for four seconds. Patient was encouraged not to talk while walking.\par \par Problem 9: Health Maintenance\par -s/p flu shot\par -recommended strep pneumonia vaccines: Prevnar-13 vaccine, follow by Pneumo vaccine 23 one year following\par -recommended early intervention for URIs\par -recommended regular osteoporosis evaluations\par -recommended early dermatological evaluations\par -recommended after the age of 50 to the age of 70, colonoscopy every 5 years\par \par f/u in 6-8 weeks\par pt is encouraged to call or fax the office with any questions or concerns.

## 2023-11-13 ENCOUNTER — APPOINTMENT (OUTPATIENT)
Dept: PULMONOLOGY | Facility: CLINIC | Age: 43
End: 2023-11-13